# Patient Record
Sex: MALE | Race: WHITE | Employment: FULL TIME | URBAN - METROPOLITAN AREA
[De-identification: names, ages, dates, MRNs, and addresses within clinical notes are randomized per-mention and may not be internally consistent; named-entity substitution may affect disease eponyms.]

---

## 2019-02-01 ENCOUNTER — OFFICE VISIT (OUTPATIENT)
Dept: OTHER | Facility: HOSPITAL | Age: 49
End: 2019-02-01
Attending: PREVENTIVE MEDICINE

## 2019-02-01 DIAGNOSIS — Z00.00 ANNUAL PHYSICAL EXAM: Primary | ICD-10-CM

## 2019-02-01 PROCEDURE — 84202 ASSAY RBC PROTOPORPHYRIN: CPT

## 2019-02-01 PROCEDURE — 83655 ASSAY OF LEAD: CPT

## 2019-02-03 LAB — LEAD, BLOOD (VENOUS): <2 UG/DL

## 2019-02-04 LAB — ZINC PROTOPORPHYRINS: 25 UMOLZPP/MOLHEM

## 2020-02-12 ENCOUNTER — OFFICE VISIT (OUTPATIENT)
Dept: OTHER | Age: 50
End: 2020-02-12
Attending: FAMILY MEDICINE

## 2020-02-12 DIAGNOSIS — Z00.00 ANNUAL PHYSICAL EXAM: Primary | ICD-10-CM

## 2020-02-12 PROCEDURE — 84202 ASSAY RBC PROTOPORPHYRIN: CPT

## 2020-02-12 PROCEDURE — 83655 ASSAY OF LEAD: CPT

## 2020-02-15 LAB — ZINC PROTOPORPHYRINS: 28 UMOLZPP/MOLHEM

## 2020-02-16 LAB
LEAD, INDUSTRIAL, WHOLE BLOOD: <2 UG/DL
ZINC PROTOPORPHYRIN, BLOOD: 14 UG/DL
ZINC PROTOPORPHYRINS: 24 UMOLZPP/MOLHEM

## 2024-10-30 ENCOUNTER — MED REC SCAN ONLY (OUTPATIENT)
Facility: LOCATION | Age: 54
End: 2024-10-30

## 2025-06-25 ENCOUNTER — MED REC SCAN ONLY (OUTPATIENT)
Facility: LOCATION | Age: 55
End: 2025-06-25

## 2025-06-27 ENCOUNTER — LAB ENCOUNTER (OUTPATIENT)
Dept: LAB | Age: 55
End: 2025-06-27
Attending: INTERNAL MEDICINE
Payer: COMMERCIAL

## 2025-06-27 DIAGNOSIS — Z12.5 SCREENING PSA (PROSTATE SPECIFIC ANTIGEN): ICD-10-CM

## 2025-06-27 DIAGNOSIS — E78.00 ELEVATED CHOLESTEROL: ICD-10-CM

## 2025-06-27 LAB
ANION GAP SERPL CALC-SCNC: 13 MMOL/L (ref 0–18)
BUN BLD-MCNC: 11 MG/DL (ref 9–23)
CALCIUM BLD-MCNC: 9.7 MG/DL (ref 8.7–10.6)
CHLORIDE SERPL-SCNC: 105 MMOL/L (ref 98–112)
CHOLEST SERPL-MCNC: 194 MG/DL (ref ?–200)
CO2 SERPL-SCNC: 22 MMOL/L (ref 21–32)
COMPLEXED PSA SERPL-MCNC: 1.99 NG/ML (ref ?–4)
CREAT BLD-MCNC: 1.13 MG/DL (ref 0.7–1.3)
EGFRCR SERPLBLD CKD-EPI 2021: 76 ML/MIN/1.73M2 (ref 60–?)
FASTING PATIENT LIPID ANSWER: YES
FASTING STATUS PATIENT QL REPORTED: YES
GLUCOSE BLD-MCNC: 94 MG/DL (ref 70–99)
HDLC SERPL-MCNC: 47 MG/DL (ref 40–59)
LDLC SERPL CALC-MCNC: 131 MG/DL (ref ?–100)
NONHDLC SERPL-MCNC: 147 MG/DL (ref ?–130)
OSMOLALITY SERPL CALC.SUM OF ELEC: 289 MOSM/KG (ref 275–295)
POTASSIUM SERPL-SCNC: 4.9 MMOL/L (ref 3.5–5.1)
SODIUM SERPL-SCNC: 140 MMOL/L (ref 136–145)
TRIGL SERPL-MCNC: 88 MG/DL (ref 30–149)
VLDLC SERPL CALC-MCNC: 16 MG/DL (ref 0–30)

## 2025-06-27 PROCEDURE — 80048 BASIC METABOLIC PNL TOTAL CA: CPT

## 2025-06-27 PROCEDURE — 80061 LIPID PANEL: CPT

## 2025-06-27 PROCEDURE — 36415 COLL VENOUS BLD VENIPUNCTURE: CPT

## 2025-07-23 ENCOUNTER — OFFICE VISIT (OUTPATIENT)
Dept: PODIATRY CLINIC | Facility: CLINIC | Age: 55
End: 2025-07-23
Payer: COMMERCIAL

## 2025-07-23 VITALS — DIASTOLIC BLOOD PRESSURE: 64 MMHG | SYSTOLIC BLOOD PRESSURE: 128 MMHG

## 2025-07-23 DIAGNOSIS — B35.1 ONYCHOMYCOSIS: ICD-10-CM

## 2025-07-23 DIAGNOSIS — M79.672 INFLAMMATORY HEEL PAIN, LEFT: ICD-10-CM

## 2025-07-23 DIAGNOSIS — M77.32 CALCANEAL SPUR OF LEFT FOOT: Primary | ICD-10-CM

## 2025-07-23 DIAGNOSIS — M72.2 PLANTAR FASCIITIS OF LEFT FOOT: ICD-10-CM

## 2025-07-23 PROCEDURE — 3074F SYST BP LT 130 MM HG: CPT | Performed by: PODIATRIST

## 2025-07-23 PROCEDURE — 3078F DIAST BP <80 MM HG: CPT | Performed by: PODIATRIST

## 2025-07-23 PROCEDURE — 20550 NJX 1 TENDON SHEATH/LIGAMENT: CPT | Performed by: PODIATRIST

## 2025-07-23 PROCEDURE — 99203 OFFICE O/P NEW LOW 30 MIN: CPT | Performed by: PODIATRIST

## 2025-07-23 RX ORDER — TRIAMCINOLONE ACETONIDE 40 MG/ML
40 INJECTION, SUSPENSION INTRA-ARTICULAR; INTRAMUSCULAR ONCE
Status: COMPLETED | OUTPATIENT
Start: 2025-07-23 | End: 2025-07-23

## 2025-07-23 NOTE — PROGRESS NOTES
Greg Bradley is a 55 year old male.   Chief Complaint   Patient presents with    New Patient     Left heel pain - pain 2/10 increasing to 7/10 with sit to stand- nail fungus right hallux         HPI:   Patient presents to the clinic complaining of painful left heel and a thickened right great toenail after an injury many years ago.  He relates 7 out of 10 pain when he first stands up on it but does hurt periodically throughout the day he points to the bottom of his left heel.  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  At today's visit reviewed nurse's history as taken above, allergies medications and medical history as documented below.  All changes duly noted  Allergies: Patient has no known allergies.   Current Medications[1]   Past Medical History[2]   Past Surgical History[3]   Family History[4]   Social Hx on file[5]        REVIEW OF SYSTEMS:   Today reviewed systens as documented below  GENERAL HEALTH: feels well otherwise  SKIN: Refer to exam below  RESPIRATORY: denies shortness of breath with exertion  CARDIOVASCULAR: denies chest pain on exertion  GI: denies abdominal pain and denies heartburn  NEURO: denies headaches    EXAM:   /64 (BP Location: Right arm, Patient Position: Sitting, Cuff Size: adult)   GENERAL: well developed, well nourished, in no apparent distress  EXTREMITIES:   1. Integument: Both feet was evaluated the right hallux nail is thickened and dystrophic I tried to get a trimming of it was little sensitive I got little bit we will send that for a fungal culture.   2. Vascular: Has palpable dorsalis pedis posterior tibial pulses bilaterally with immediate cap return of the pedal digits   3. Neurologic: Has intact sensorium   4. Musculoskeletal: Patient has fairly severe pain on palpation of the tubercles of the calcaneus where the plantar fascia attaches in all muscle groups fire 5 out of 5.  X-rays were taken 2  views of the heel showing retro and infracalcaneal spurring.    ASSESSMENT AND PLAN:   Diagnoses and all orders for this visit:    Calcaneal spur of left foot  -     triamcinolone acetonide (Kenalog-40) 40 MG/ML injection 40 mg  -     MISC [facility] PROC FOR MANAGED CARE AUTH    Plantar fasciitis of left foot  -     triamcinolone acetonide (Kenalog-40) 40 MG/ML injection 40 mg  -     MISC [facility] PROC FOR MANAGED CARE AUTH    Inflammatory heel pain, left  -     triamcinolone acetonide (Kenalog-40) 40 MG/ML injection 40 mg  -     MISC [facility] PROC FOR MANAGED CARE AUTH    Onychomycosis  -     Fungus Hair, Skin, Nail Culture (Dermatophyte); Future    Other orders  -     [facility] AMB PODIATRY XR - LEFT HEEL 2 VIEWS(CALCANEUS)        Plan: At today's visit I reviewed different treatments for the fungal nails I usually recommend Lamisil tablet therapy but we can also try topicals.  Will await fungal culture results.  I educated the patient about plantar fasciitis the mechanical problem going on in the heel.    Today discussed the nature and extent of a cortisone injection as well as the possible complications and risks.  Patient was in agreement to receive the injection.  The patient was consented for a cortisone injection and after timeout was taken the injection consisting of 40 mg Kenalog and 1.0 cc of 0.5% Marcaine plain was injected into the symptomatic plantar fascia of the left heel using aseptic technique and appropriate approach.  A Band-Aid was applied to the injection site.   Dispensed stretching exercises for the patient to do 3 times daily and will place a referral for orthotics because this gentleman works on his feet all day and security being a retired  I think that it would be beneficial for him to have these and make him more productive at work.  The patient indicates understanding of these issues and agrees to the plan.    Percy Garg DPM       [1]   Current Outpatient Medications    Medication Sig Dispense Refill    atorvastatin 10 MG Oral Tab       Enalapril Maleate 20 MG Oral Tab       Montelukast Sodium 10 MG Oral Tab       PEG 3350-KCl-NaBcb-NaCl-NaSulf (PEG 3350/ELECTROLYTES) 240 g Oral Recon Soln Take as directed by physician. 4000 mL 0   [2]   Past Medical History:   High cholesterol    I take 10mg lipitor generic, under control.    Wears glasses    Contact lenses/glasses since 5th grade   [3] History reviewed. No pertinent surgical history.  [4]   Family History  Problem Relation Age of Onset    Heart Attack Father          of heart attack at 54    Other Brother          of Lung cancer at 47   [5]   Social History  Socioeconomic History    Marital status:    Tobacco Use    Smoking status: Never    Tobacco comments:     Lived in smoking household until age 14. Never smoked.   Substance and Sexual Activity    Alcohol use: Yes     Comment: Casual no more than 10 beers a week    Drug use: Never

## 2025-08-11 ENCOUNTER — TELEPHONE (OUTPATIENT)
Dept: ORTHOPEDICS CLINIC | Facility: CLINIC | Age: 55
End: 2025-08-11

## (undated) NOTE — LETTER
AUTHORIZATION FOR SURGICAL OPERATION OR OTHER PROCEDURE    1. I hereby authorize Dr. Percy Garg, and [facility] staff assigned to my case to perform the following operation and/or procedure at the [facility] site:    ______________________________________________________________________________________________    Cortisone Injection Left Heel  _______________________________________________________________________________________________    2.  My physician has explained the nature and purpose of the operation or other procedure, possible alternative methods of treatment, the risks involved, and the possibility of complication to me.  I acknowledge that no guarantee has been made as to the result that may be obtained.  3.  I recognize that, during the course of this operation, or other procedure, unforseen conditions may necessitate additional or different procedure than those listed above.  I, therefore, further authorize and request that the above named physician, his/her physician assistants or designees perform such procedures as are, in his/her professional opinion, necessary and desirable.  4.  Any tissue or organs removed in the operation or other procedure may be disposed of by and at the discretion of the [facility] and [facility].  5.  I understand that in the event of a medical emergency, I will be transported by local paramedics to [facility] or other hospital emergency department.  6.  I certify that I have read and fully understand the above consent to operation and/or other procedure.    7.  I acknowledge that my physician has explained sedation/analgesia administration to me including the risks and benefits.  I consent to the administration of sedation/analgesia as may be necessary or desirable in the judgement of my physician.    Witness signature: ___________________________________________________ Date:   ______/______/_____                    Time:  ________ A.M.  P.M.       Patient Name:  ______________________________________________________  (please print)      Patient signature:  ___________________________________________________             Relationship to Patient:           []  Parent    Responsible person                          []  Spouse  In case of minor or                    [] Other  _____________   Incompetent name:  __________________________________________________                               (please print)      _____________      Responsible person  In case of minor or  Incompetent signature:  _______________________________________________    Statement of Physician  My signature below affirms that prior to the time of the procedure, I have explained to the patient and/or his/her guardian, the risks and benefits involved in the proposed treatment and any reasonable alternative to the proposed treatment.  I have also explained the risks and benefits involved in the refusal of the proposed treatment and have answered the patient's questions.                        Date:  ______/______/_______  Provider                      Signature:  __________________________________________________________       Time:  ___________ A.M    P.M.